# Patient Record
Sex: MALE | Race: WHITE | Employment: FULL TIME | URBAN - METROPOLITAN AREA
[De-identification: names, ages, dates, MRNs, and addresses within clinical notes are randomized per-mention and may not be internally consistent; named-entity substitution may affect disease eponyms.]

---

## 2023-02-10 ENCOUNTER — HOSPITAL ENCOUNTER (EMERGENCY)
Age: 38
Discharge: HOME | End: 2023-02-10
Attending: EMERGENCY MEDICINE
Payer: COMMERCIAL

## 2023-02-10 ENCOUNTER — OFFICE VISIT (OUTPATIENT)
Dept: PRIMARY CARE CLINIC | Age: 38
End: 2023-02-10

## 2023-02-10 ENCOUNTER — APPOINTMENT (OUTPATIENT)
Dept: CT IMAGING | Age: 38
End: 2023-02-10
Attending: EMERGENCY MEDICINE
Payer: COMMERCIAL

## 2023-02-10 VITALS
RESPIRATION RATE: 16 BRPM | OXYGEN SATURATION: 97 % | HEART RATE: 86 BPM | SYSTOLIC BLOOD PRESSURE: 126 MMHG | TEMPERATURE: 97.2 F | WEIGHT: 200.2 LBS | BODY MASS INDEX: 28.66 KG/M2 | DIASTOLIC BLOOD PRESSURE: 84 MMHG | HEIGHT: 70 IN

## 2023-02-10 VITALS
WEIGHT: 198.85 LBS | TEMPERATURE: 98.1 F | RESPIRATION RATE: 14 BRPM | SYSTOLIC BLOOD PRESSURE: 138 MMHG | OXYGEN SATURATION: 98 % | BODY MASS INDEX: 28.53 KG/M2 | DIASTOLIC BLOOD PRESSURE: 92 MMHG | HEART RATE: 78 BPM

## 2023-02-10 DIAGNOSIS — R10.31 RLQ ABDOMINAL PAIN: Primary | ICD-10-CM

## 2023-02-10 DIAGNOSIS — R10.9 ACUTE ABDOMINAL PAIN: Primary | ICD-10-CM

## 2023-02-10 LAB
ALBUMIN SERPL-MCNC: 3.9 G/DL (ref 3.5–5)
ALBUMIN/GLOB SERPL: 1 (ref 1.1–2.2)
ALP SERPL-CCNC: 89 U/L (ref 45–117)
ALT SERPL-CCNC: 26 U/L (ref 12–78)
ANION GAP SERPL CALC-SCNC: 5 MMOL/L (ref 5–15)
APPEARANCE UR: CLEAR
AST SERPL-CCNC: 22 U/L (ref 15–37)
BACTERIA URNS QL MICRO: NEGATIVE /HPF
BASOPHILS # BLD: 0.1 K/UL (ref 0–0.1)
BASOPHILS NFR BLD: 1 % (ref 0–1)
BILIRUB SERPL-MCNC: 1 MG/DL (ref 0.2–1)
BILIRUB UR QL: NEGATIVE
BUN SERPL-MCNC: 15 MG/DL (ref 6–20)
BUN/CREAT SERPL: 11 (ref 12–20)
CALCIUM SERPL-MCNC: 9.4 MG/DL (ref 8.5–10.1)
CHLORIDE SERPL-SCNC: 106 MMOL/L (ref 97–108)
CO2 SERPL-SCNC: 26 MMOL/L (ref 21–32)
COLOR UR: ABNORMAL
CREAT SERPL-MCNC: 1.34 MG/DL (ref 0.7–1.3)
DIFFERENTIAL METHOD BLD: ABNORMAL
EOSINOPHIL # BLD: 0.3 K/UL (ref 0–0.4)
EOSINOPHIL NFR BLD: 2 % (ref 0–7)
EPITH CASTS URNS QL MICRO: ABNORMAL /LPF
ERYTHROCYTE [DISTWIDTH] IN BLOOD BY AUTOMATED COUNT: 12.7 % (ref 11.5–14.5)
GLOBULIN SER CALC-MCNC: 3.9 G/DL (ref 2–4)
GLUCOSE SERPL-MCNC: 99 MG/DL (ref 65–100)
GLUCOSE UR STRIP.AUTO-MCNC: NEGATIVE MG/DL
HCT VFR BLD AUTO: 42.7 % (ref 36.6–50.3)
HGB BLD-MCNC: 14.3 G/DL (ref 12.1–17)
HGB UR QL STRIP: NEGATIVE
IMM GRANULOCYTES # BLD AUTO: 0.1 K/UL (ref 0–0.04)
IMM GRANULOCYTES NFR BLD AUTO: 0 % (ref 0–0.5)
KETONES UR QL STRIP.AUTO: ABNORMAL MG/DL
LEUKOCYTE ESTERASE UR QL STRIP.AUTO: ABNORMAL
LIPASE SERPL-CCNC: 115 U/L (ref 73–393)
LYMPHOCYTES # BLD: 2.3 K/UL (ref 0.8–3.5)
LYMPHOCYTES NFR BLD: 16 % (ref 12–49)
MCH RBC QN AUTO: 30 PG (ref 26–34)
MCHC RBC AUTO-ENTMCNC: 33.5 G/DL (ref 30–36.5)
MCV RBC AUTO: 89.7 FL (ref 80–99)
MONOCYTES # BLD: 1.1 K/UL (ref 0–1)
MONOCYTES NFR BLD: 7 % (ref 5–13)
NEUTS SEG # BLD: 10.4 K/UL (ref 1.8–8)
NEUTS SEG NFR BLD: 74 % (ref 32–75)
NITRITE UR QL STRIP.AUTO: NEGATIVE
NRBC # BLD: 0 K/UL (ref 0–0.01)
NRBC BLD-RTO: 0 PER 100 WBC
PH UR STRIP: 6.5 (ref 5–8)
PLATELET # BLD AUTO: 366 K/UL (ref 150–400)
PMV BLD AUTO: 9.1 FL (ref 8.9–12.9)
POTASSIUM SERPL-SCNC: 4.1 MMOL/L (ref 3.5–5.1)
PROT SERPL-MCNC: 7.8 G/DL (ref 6.4–8.2)
PROT UR STRIP-MCNC: NEGATIVE MG/DL
RBC # BLD AUTO: 4.76 M/UL (ref 4.1–5.7)
RBC #/AREA URNS HPF: ABNORMAL /HPF (ref 0–5)
SODIUM SERPL-SCNC: 137 MMOL/L (ref 136–145)
SP GR UR REFRACTOMETRY: 1.02
UA: UC IF INDICATED,UAUC: ABNORMAL
UROBILINOGEN UR QL STRIP.AUTO: 0.2 EU/DL (ref 0.2–1)
WBC # BLD AUTO: 14.2 K/UL (ref 4.1–11.1)
WBC URNS QL MICRO: ABNORMAL /HPF (ref 0–4)

## 2023-02-10 PROCEDURE — 74177 CT ABD & PELVIS W/CONTRAST: CPT

## 2023-02-10 PROCEDURE — 81001 URINALYSIS AUTO W/SCOPE: CPT

## 2023-02-10 PROCEDURE — 99202 OFFICE O/P NEW SF 15 MIN: CPT

## 2023-02-10 PROCEDURE — 36415 COLL VENOUS BLD VENIPUNCTURE: CPT

## 2023-02-10 PROCEDURE — 80053 COMPREHEN METABOLIC PANEL: CPT

## 2023-02-10 PROCEDURE — 85025 COMPLETE CBC W/AUTO DIFF WBC: CPT

## 2023-02-10 PROCEDURE — 99285 EMERGENCY DEPT VISIT HI MDM: CPT

## 2023-02-10 PROCEDURE — 74011000636 HC RX REV CODE- 636: Performed by: EMERGENCY MEDICINE

## 2023-02-10 PROCEDURE — 83690 ASSAY OF LIPASE: CPT

## 2023-02-10 RX ORDER — ONDANSETRON 4 MG/1
4 TABLET, ORALLY DISINTEGRATING ORAL
Qty: 20 TABLET | Refills: 0 | Status: SHIPPED | OUTPATIENT
Start: 2023-02-10

## 2023-02-10 RX ORDER — AMOXICILLIN AND CLAVULANATE POTASSIUM 875; 125 MG/1; MG/1
1 TABLET, FILM COATED ORAL 2 TIMES DAILY
Qty: 14 TABLET | Refills: 0 | Status: SHIPPED | OUTPATIENT
Start: 2023-02-10 | End: 2023-02-17

## 2023-02-10 RX ADMIN — IOPAMIDOL 100 ML: 755 INJECTION, SOLUTION INTRAVENOUS at 09:41

## 2023-02-10 NOTE — CONSULTS
CONSULT  HOSPITAL     Subjective:      Kareem Ramirez is a 30 y.o. male who presents for evaluation of right lower quadrant abdominal pain. Patient is a relatively healthy 28-year-old male, works for an [employer], no history of trauma, has history of cholecystectomy at age 17, some vague remote history of possible early Crohn's disease although never specifically diagnosed according to patient. He had a colonoscopy a couple of years ago. He has not had any diarrhea or blood per rectum or urinary symptoms. He has no microscopic or other hematuria. Yesterday evening he began having right lower quadrant abdominal pain, even while supine and resting, rather severe, but has noted that he still has some pain but it is much improved and does not hurt at rest any further. He has not eaten any raw or unusual foods. White blood cell count had white blood cell count of 14,000 electrolytes otherwise fairly unremarkable, CT scan abdomen and pelvis with reviewed with radiology with nurse practitioner Adela Shirley, revealed normal appendix and no significant evidence of inflammatory bowel disease. I was asked to see patient whether he could have appendicitis despite negative CT scan        Recent Results (from the past 48 hour(s))   METABOLIC PANEL, COMPREHENSIVE    Collection Time: 02/10/23  8:55 AM   Result Value Ref Range    Sodium 137 136 - 145 mmol/L    Potassium 4.1 3.5 - 5.1 mmol/L    Chloride 106 97 - 108 mmol/L    CO2 26 21 - 32 mmol/L    Anion gap 5 5 - 15 mmol/L    Glucose 99 65 - 100 mg/dL    BUN 15 6 - 20 MG/DL    Creatinine 1.34 (H) 0.70 - 1.30 MG/DL    BUN/Creatinine ratio 11 (L) 12 - 20      eGFR >60 >60 ml/min/1.73m2    Calcium 9.4 8.5 - 10.1 MG/DL    Bilirubin, total 1.0 0.2 - 1.0 MG/DL    ALT (SGPT) 26 12 - 78 U/L    AST (SGOT) 22 15 - 37 U/L    Alk.  phosphatase 89 45 - 117 U/L    Protein, total 7.8 6.4 - 8.2 g/dL    Albumin 3.9 3.5 - 5.0 g/dL    Globulin 3.9 2.0 - 4.0 g/dL    A-G Ratio 1.0 (L) 1.1 - 2.2     LIPASE    Collection Time: 02/10/23  8:55 AM   Result Value Ref Range    Lipase 115 73 - 393 U/L   CBC WITH AUTOMATED DIFF    Collection Time: 02/10/23  8:55 AM   Result Value Ref Range    WBC 14.2 (H) 4.1 - 11.1 K/uL    RBC 4.76 4.10 - 5.70 M/uL    HGB 14.3 12.1 - 17.0 g/dL    HCT 42.7 36.6 - 50.3 %    MCV 89.7 80.0 - 99.0 FL    MCH 30.0 26.0 - 34.0 PG    MCHC 33.5 30.0 - 36.5 g/dL    RDW 12.7 11.5 - 14.5 %    PLATELET 114 699 - 650 K/uL    MPV 9.1 8.9 - 12.9 FL    NRBC 0.0 0  WBC    ABSOLUTE NRBC 0.00 0.00 - 0.01 K/uL    NEUTROPHILS 74 32 - 75 %    LYMPHOCYTES 16 12 - 49 %    MONOCYTES 7 5 - 13 %    EOSINOPHILS 2 0 - 7 %    BASOPHILS 1 0 - 1 %    IMMATURE GRANULOCYTES 0 0.0 - 0.5 %    ABS. NEUTROPHILS 10.4 (H) 1.8 - 8.0 K/UL    ABS. LYMPHOCYTES 2.3 0.8 - 3.5 K/UL    ABS. MONOCYTES 1.1 (H) 0.0 - 1.0 K/UL    ABS. EOSINOPHILS 0.3 0.0 - 0.4 K/UL    ABS. BASOPHILS 0.1 0.0 - 0.1 K/UL    ABS. IMM. GRANS. 0.1 (H) 0.00 - 0.04 K/UL    DF AUTOMATED     URINALYSIS W/ REFLEX CULTURE    Collection Time: 02/10/23  8:55 AM    Specimen: Urine   Result Value Ref Range    Color DARK YELLOW      Appearance CLEAR CLEAR      Specific gravity 1.023      pH (UA) 6.5 5.0 - 8.0      Protein Negative NEG mg/dL    Glucose Negative NEG mg/dL    Ketone TRACE (A) NEG mg/dL    Bilirubin Negative NEG      Blood Negative NEG      Urobilinogen 0.2 0.2 - 1.0 EU/dL    Nitrites Negative NEG      Leukocyte Esterase SMALL (A) NEG      WBC 0-4 0 - 4 /hpf    RBC 0-5 0 - 5 /hpf    Epithelial cells FEW FEW /lpf    Bacteria Negative NEG /hpf    UA:UC IF INDICATED CULTURE NOT INDICATED BY UA RESULT CNI         XR Results (maximum last 3): Results from [place] encounter on 03/13/16    XR CHEST PA LAT    Impression  :    There is no acute process.  Stable exam.          Signing/Reading Doctor: Rosalind Davidson ([id number])  Approved: Rosalind Davidson ([id number])  Mar 13 2016 10:23PM      Results from Hospital Encounter encounter on 05/13/15    XR KNEE LT 3 V    Impression  :  No acute abnormality. Signing/Reading Doctor: Shanika Rust ([id number])  Approved: Shanika Rust ([id number])  May 13 2015  3:45PM      Results from Hospital Encounter encounter on 03/17/13    XR SHOULDER RT AP/LAT MIN 2 V      CT Results (maximum last 3): Results from [place] encounter on 02/10/23    CT ABD PELV W CONT    Impression  No acute process. No evidence of appendicitis. Results from [place] encounter on 03/17/13    CT SPINE CERV WO CONT      CT HEAD WO CONT      MRI Results (maximum last 3): Results from [place] encounter on 06/10/15    MRI BRAIN W WO CONT    Impression  :  1. Somewhat heterogeneous enhancement of a normal-sized pituitary gland,  with a question of a 5 mm focal area on the left, which could be a  microadenoma. 2. Otherwise negative MRI of the brain and pituitary. Signing/Reading Doctor: Shanika Rust ([id number])  Approved: Shanika Rust ([id number])  Jf 10 2015 10:11AM      Results from [place] encounter on 11/13/12    MRI LUMB SPINE WO CONT      Nuclear Medicine Results (maximum last 3): No results found for this or any previous visit. US Results (maximum last 3): No results found for this or any previous visit.       Past Medical History:   Diagnosis Date    Hypotestosteronism     Other ill-defined conditions(799.89)     GERD    Pituitary tumor      Past Surgical History:   Procedure Laterality Date    HX CHOLECYSTECTOMY        Family History   Problem Relation Age of Onset    Thyroid Disease Maternal Aunt     Hypertension Paternal Aunt     Thyroid Disease Paternal Uncle     Hypertension Paternal Uncle     Hypertension Maternal Grandmother     Diabetes Maternal Grandfather     Hypertension Maternal Grandfather     Stroke Maternal Grandfather     Thyroid Disease Paternal Grandmother     Hypertension Paternal Grandmother     Hypertension Paternal Grandfather      Social History Tobacco Use    Smoking status: Never    Smokeless tobacco: Not on file   Substance Use Topics    Alcohol use: Yes      Prior to Admission medications    Medication Sig Start Date End Date Taking? Authorizing Provider   MULTIVITAMIN PO Take  by mouth. Yes Provider, Pamela   omeprazole (PRILOSEC) 20 mg capsule Take 20 mg by mouth daily. Yes Other, MD Shanon   ibuprofen (MOTRIN) 600 mg tablet Take 1 Tab by mouth every six (6) hours as needed for Pain. Patient not taking: Reported on 2/10/2023 3/13/16   John Bradley MD      Allergies   Allergen Reactions    Codeine Nausea and Vomiting       Review of Systems   Gastrointestinal:  Negative for blood in stool, diarrhea, nausea and vomiting. Improved abdominal pain   Genitourinary: Negative. All other systems reviewed and are negative. Objective:     Patient Vitals for the past 8 hrs:   BP Temp Pulse Resp SpO2 Weight   02/10/23 0846 (!) 138/92 98.1 °F (36.7 °C) 78 14 98 % 90.2 kg (198 lb 13.7 oz)       Temp (24hrs), Av.7 °F (36.5 °C), Min:97.2 °F (36.2 °C), Max:98.1 °F (36.7 °C)      Physical Exam  Vitals and nursing note reviewed. Exam conducted with a chaperone present. Constitutional:       General: He is not in acute distress. Appearance: Normal appearance. He is not ill-appearing. HENT:      Head: Normocephalic and atraumatic. Nose: Nose normal.      Mouth/Throat:      Pharynx: Oropharynx is clear. Eyes:      Conjunctiva/sclera: Conjunctivae normal.   Cardiovascular:      Rate and Rhythm: Normal rate and regular rhythm. Pulmonary:      Effort: Pulmonary effort is normal.      Breath sounds: Normal breath sounds. Abdominal:      Comments: Flat and soft no obvious hernias mild to minimal right lower quadrant abdominal tenderness no peritoneal signs or guarding or percussion tenderness rest of the abdomen is soft and nontender   Musculoskeletal:         General: Normal range of motion.    Skin:     General: Skin is warm and dry. Neurological:      General: No focal deficit present. Mental Status: He is alert and oriented to person, place, and time. Psychiatric:         Mood and Affect: Mood normal.         Behavior: Behavior normal.         Thought Content: Thought content normal.         Judgment: Judgment normal.       Assessment:     Active Problems:    Right lower quadrant abdominal pain (2/10/2023)        Plan: This could be a very early early appendicitis not seen on CT scan or an aborted appendicitis but if it was appendicitis currently I would expect him to feel worse or not improving. This also could be early inflammatory bowel disease or enteritis of some other sort. I had a lengthy discussion with patient, review of imaging, discussion with gastroenterology who has already seen patient, and who feels this is not a Crohn's exacerbation currently, and does not need follow-up colonoscopy at this point. Despite CT scan, I explained to patient I could not entirely rule out a very early appendicitis and if patient wanted and elected to be sure then I was willing to proceed with laparoscopic possible open appendectomy but understanding potential morbidity mortality negative findings although we would remove the appendix anyway, could not promise that this would resolve any future symptoms but at least we would know whether it was the appendix or not going forward, I certainly did not recommend this but offered it if patient wanted to be sure. Another option is to treat this with antibiotics for possible early appendicitis and defer any future surgical intervention to a later date if recurring symptoms or if patient's symptoms worsen and more definitive evidence of appendicitis versus enteritis whether in the next 24 to 48 hours or further down the road.     Patient listed benefits risks alternatives and he preferred outpatient therapy no surgery at this point, have reviewed with emergency room physician  and patient to follow-up with PCP or with our office in the next week or so but return to the emergency room immediately if symptoms begin worsening or recurring. Patient to stick with liquid diet advancing to  low residue diet for next few days      FACE TO FACE time including review of any indicated imaging, discussion with patient, and other providers, exam and discussion with patient:   72          Minutes  review of CT , ,hx and exam and review with GI.      Signed By: Atul Wren MD   [facility]    February 10, 2023

## 2023-02-10 NOTE — ED NOTES
ambulatory to ED with cc of LRQ abd pain since last night with nausea.  Only GI hx is cholecystectomy when he was 14

## 2023-02-10 NOTE — DISCHARGE INSTRUCTIONS
You were evaluated in the emergency department for right lower quadrant abdominal pain. Your examination was reassuring as was your work-up including blood work which had a mild leukocytosis at 14,000 as well as your CT scan which did not show any signs of acute appendicitis. It will be important for you to follow-up with your primary care physician in 2-3 days. You can also make an appoint with GI and general surgery. Please take the antibiotics as prescribed until completion. If you develop worsening symptoms such as fevers or worsening abdominal pain, please return to the emergency department immediately.

## 2023-02-10 NOTE — ED PROVIDER NOTES
[facility]ospit[facility] EMERGENCY DEPT  EMERGENCY DEPARTMENT ENCOUNTER       Pt Name: Brianna Gary  MRN: [id number]  [place] 1985  Date of evaluation: 2/10/2023  Provider: Barron Harp MD   PCP: None  Note Started: 10:29 AM 2/10/23     CHIEF COMPLAINT       Chief Complaint   Patient presents with    Abdominal Pain     Onset last night with lower right quadrant pain, that is tender. Seen at [facility], and NP advise pt to come to ED. No vomiting. +nausea last night. HISTORY OF PRESENT ILLNESS: 1 or more elements      History From: Patient, History limited by: No limitations     Brianna Gary is a 30 y.o. male without significant medical history who presents with chief complaint of right lower quadrant abdominal pain. Symptoms onset yesterday evening around 7 PM.  Pain is located to the right lower quadrant without radiation, associate with nausea without vomiting. No fevers, no urinary symptoms, flank pain, or change in bowel habits. Pain is unfamiliar to the patient and has become severe. Pain is worsened with positional changes. He does have history of cholecystectomy but no other abdominal surgical history. Nursing Notes were all reviewed and agreed with or any disagreements were addressed in the HPI. REVIEW OF SYSTEMS        Positives and Pertinent negatives as per HPI.     PAST HISTORY     Past Medical History:  Past Medical History:   Diagnosis Date    Hypotestosteronism     Other ill-defined conditions(799.89)     GERD    Pituitary tumor        Past Surgical History:  Past Surgical History:   Procedure Laterality Date    HX CHOLECYSTECTOMY         Family History:  Family History   Problem Relation Age of Onset    Thyroid Disease Maternal Aunt     Hypertension Paternal Aunt     Thyroid Disease Paternal Uncle     Hypertension Paternal Uncle     Hypertension Maternal Grandmother     Diabetes Maternal Grandfather     Hypertension Maternal Grandfather     Stroke Maternal Grandfather     Thyroid Disease Paternal Grandmother     Hypertension Paternal Grandmother     Hypertension Paternal Grandfather        Social History:  Social History     Tobacco Use    Smoking status: Never   Substance Use Topics    Alcohol use: Yes    Drug use: No       Allergies: Allergies   Allergen Reactions    Codeine Nausea and Vomiting       CURRENT MEDICATIONS      Discharge Medication List as of 2/10/2023 12:15 PM        CONTINUE these medications which have NOT CHANGED    Details   MULTIVITAMIN PO Take  by mouth., Historical Med      omeprazole (PRILOSEC) 20 mg capsule Take 20 mg by mouth daily. , Historical Med      ibuprofen (MOTRIN) 600 mg tablet Take 1 Tab by mouth every six (6) hours as needed for Pain., Print, Disp-20 Tab, R-0             SCREENINGS               No data recorded         PHYSICAL EXAM      ED Triage Vitals [02/10/23 0846]   ED Encounter Vitals Group      BP (!) 138/92      Pulse (Heart Rate) 78      Resp Rate 14      Temp 98.1 °F (36.7 °C)      Temp src       O2 Sat (%) 98 %      Weight 198 lb 13.7 oz      Height         Physical Exam  Vitals and nursing note reviewed. Constitutional:       General: He is not in acute distress. Appearance: He is well-developed. He is not ill-appearing. Cardiovascular:      Rate and Rhythm: Normal rate and regular rhythm. Heart sounds: No murmur heard. Pulmonary:      Effort: Pulmonary effort is normal. No respiratory distress. Breath sounds: Normal breath sounds. Abdominal:      General: Abdomen is flat. There is no distension. Tenderness: There is abdominal tenderness (Reproducible TTP at McBurney's point in the right lower quadrant with guarding and rebound, positive Rovsing sign, positive psoas sign. ). There is guarding and rebound. There is no right CVA tenderness or left CVA tenderness. Neurological:      Mental Status: He is alert.         DIAGNOSTIC RESULTS   LABS:     Recent Results (from the past 12 hour(s))   METABOLIC PANEL, COMPREHENSIVE    Collection Time: 02/10/23  8:55 AM   Result Value Ref Range    Sodium 137 136 - 145 mmol/L    Potassium 4.1 3.5 - 5.1 mmol/L    Chloride 106 97 - 108 mmol/L    CO2 26 21 - 32 mmol/L    Anion gap 5 5 - 15 mmol/L    Glucose 99 65 - 100 mg/dL    BUN 15 6 - 20 MG/DL    Creatinine 1.34 (H) 0.70 - 1.30 MG/DL    BUN/Creatinine ratio 11 (L) 12 - 20      eGFR >60 >60 ml/min/1.73m2    Calcium 9.4 8.5 - 10.1 MG/DL    Bilirubin, total 1.0 0.2 - 1.0 MG/DL    ALT (SGPT) 26 12 - 78 U/L    AST (SGOT) 22 15 - 37 U/L    Alk. phosphatase 89 45 - 117 U/L    Protein, total 7.8 6.4 - 8.2 g/dL    Albumin 3.9 3.5 - 5.0 g/dL    Globulin 3.9 2.0 - 4.0 g/dL    A-G Ratio 1.0 (L) 1.1 - 2.2     LIPASE    Collection Time: 02/10/23  8:55 AM   Result Value Ref Range    Lipase 115 73 - 393 U/L   CBC WITH AUTOMATED DIFF    Collection Time: 02/10/23  8:55 AM   Result Value Ref Range    WBC 14.2 (H) 4.1 - 11.1 K/uL    RBC 4.76 4.10 - 5.70 M/uL    HGB 14.3 12.1 - 17.0 g/dL    HCT 42.7 36.6 - 50.3 %    MCV 89.7 80.0 - 99.0 FL    MCH 30.0 26.0 - 34.0 PG    MCHC 33.5 30.0 - 36.5 g/dL    RDW 12.7 11.5 - 14.5 %    PLATELET 287 629 - 504 K/uL    MPV 9.1 8.9 - 12.9 FL    NRBC 0.0 0  WBC    ABSOLUTE NRBC 0.00 0.00 - 0.01 K/uL    NEUTROPHILS 74 32 - 75 %    LYMPHOCYTES 16 12 - 49 %    MONOCYTES 7 5 - 13 %    EOSINOPHILS 2 0 - 7 %    BASOPHILS 1 0 - 1 %    IMMATURE GRANULOCYTES 0 0.0 - 0.5 %    ABS. NEUTROPHILS 10.4 (H) 1.8 - 8.0 K/UL    ABS. LYMPHOCYTES 2.3 0.8 - 3.5 K/UL    ABS. MONOCYTES 1.1 (H) 0.0 - 1.0 K/UL    ABS. EOSINOPHILS 0.3 0.0 - 0.4 K/UL    ABS. BASOPHILS 0.1 0.0 - 0.1 K/UL    ABS. IMM.  GRANS. 0.1 (H) 0.00 - 0.04 K/UL    DF AUTOMATED     URINALYSIS W/ REFLEX CULTURE    Collection Time: 02/10/23  8:55 AM    Specimen: Urine   Result Value Ref Range    Color DARK YELLOW      Appearance CLEAR CLEAR      Specific gravity 1.023      pH (UA) 6.5 5.0 - 8.0      Protein Negative NEG mg/dL    Glucose Negative NEG mg/dL    Ketone TRACE (A) NEG mg/dL    Bilirubin Negative NEG      Blood Negative NEG      Urobilinogen 0.2 0.2 - 1.0 EU/dL    Nitrites Negative NEG      Leukocyte Esterase SMALL (A) NEG      WBC 0-4 0 - 4 /hpf    RBC 0-5 0 - 5 /hpf    Epithelial cells FEW FEW /lpf    Bacteria Negative NEG /hpf    UA:UC IF INDICATED CULTURE NOT INDICATED BY UA RESULT CNI          EKG: If performed, independent interpretation documented below in the MDM section     RADIOLOGY:  Non-plain film images such as CT, Ultrasound and MRI are read by the radiologist. Plain radiographic images are visualized and preliminarily interpreted by the ED Provider with the findings documented in the MDM section. Interpretation per the Radiologist below, if available at the time of this note:     CT ABD PELV W CONT    Result Date: 2/10/2023  EXAM: CT ABD PELV W CONT INDICATION: RLQ pain, n/v, wbc 14K, r/o appendicitis COMPARISON: None available CONTRAST: 100 mL of Isovue-370. ORAL CONTRAST: None TECHNIQUE: Following the uneventful intravenous administration of contrast, thin axial images were obtained through the abdomen and pelvis. Coronal and sagittal reconstructions were generated. CT dose reduction was achieved through use of a standardized protocol tailored for this examination and automatic exposure control for dose modulation. FINDINGS: LOWER THORAX: No significant abnormality in the incidentally imaged lower chest. LIVER: No mass. BILIARY TREE: Status post cholecystectomy. CBD is not dilated. SPLEEN: within normal limits. PANCREAS: No mass or ductal dilatation. ADRENALS: Unremarkable. KIDNEYS: No mass, calculus, or hydronephrosis. STOMACH: Unremarkable. SMALL BOWEL: No dilatation or wall thickening. COLON: No dilatation or wall thickening. APPENDIX: Unremarkable. PERITONEUM: No ascites or pneumoperitoneum. RETROPERITONEUM: No lymphadenopathy or aortic aneurysm. REPRODUCTIVE ORGANS: Unremarkable. URINARY BLADDER: No mass or calculus.  BONES: No destructive bone lesion. ABDOMINAL WALL: No mass or hernia. ADDITIONAL COMMENTS: N/A     No acute process. No evidence of appendicitis. PROCEDURES   Unless otherwise noted below, none  Procedures     CRITICAL CARE TIME   0    EMERGENCY DEPARTMENT COURSE and DIFFERENTIAL DIAGNOSIS/MDM   Vitals:    Vitals:    02/10/23 0846   BP: (!) 138/92   Pulse: 78   Resp: 14   Temp: 98.1 °F (36.7 °C)   SpO2: 98%   Weight: 90.2 kg (198 lb 13.7 oz)        Patient was given the following medications:  Medications   iopamidoL (ISOVUE-370) 370 mg iodine /mL (76 %) injection 100 mL (100 mL IntraVENous Given 2/10/23 0941)       Medical Decision Making  Amount and/or Complexity of Data Reviewed  Labs: ordered. Decision-making details documented in ED Course. Radiology: ordered. Decision-making details documented in ED Course. Discussion of management or test interpretation with external provider(s): Surgery, Dr Coby Cueto  OTC drugs. Prescription drug management. Decision regarding hospitalization. This is a healthy 56-year-old male presenting to the emergency department with 12 to 15 hours of right lower quadrant abdominal pain with associated nausea without vomiting. Afebrile and vital signs stable. He does have a very concerning exam for acute appendicitis with TTP to the right lower quadrant with associated guarding and rebound. He does have positive Rovsing sign as well as a positive psoas sign. CT however negative for acute process. Serial abdominal exams demonstrate persistent right lower quadrant pain at McBurney's point. I maintain high suspicion for acute appendicitis and I will consult general surgery. ED Course as of 02/10/23 1522   Fri Feb 10, 2023   1021 On reassessment patient still having significant reproducible TTP with guarding and rebound at McBurney's point with positive Rovsing sign and positive psoas sign.   He is noted to have leukocytosis 14,000 however CT imaging negative for acute process such as appendicitis. I have maintained high clinical concern for acute appendicitis, symptoms have been present for just over 12 hours and it may be that it is too early to see radiographically. I will consult general surgery. [AK]   1216 Patient has been evaluated by general surgery as well as gastroenterology. Concern is for possible terminal ileitis reflective of IBD or other colitis versus early acute appendicitis. General surgery offered appendectomy but patient defers at this time. Recommend starting on Augmentin x1 week with follow-up with surgery, PCP, GI. Patient knows to return to the ED immediately for any development of fevers or worsening abdominal pain. He lives nearby and does not have transportation issues. He is in agreement with this plan and comfortable. All questions answered. [AK]      ED Course User Index  [AK] Eliza Hdez MD       FINAL IMPRESSION     1. RLQ abdominal pain          DISPOSITION/PLAN     Discharge Note:  The patient has been re-evaluated and is ready for discharge. Reviewed available results with patient. Counseled patient on diagnosis and care plan. Patient has expressed understanding, and all questions have been answered. Patient agrees with plan and agrees to follow up as recommended, or to return to the ED if their symptoms worsen. Discharge instructions have been provided and explained to the patient, along with reasons to return to the ED.         CLINICAL IMPRESSION    1. RLQ abdominal pain         DISPOSITION  discharge     PATIENT REFERRED TO:  Follow-up Information       Follow up With Specialties Details Why Demarco Salvador MD Surgery Physician Schedule an appointment as soon as possible for a visit   [facility] [address]  [address] [facility]      Freedom Cash MD Gastroenterology Schedule an appointment as soon as possible for a visit   [address]  [address] [address] [phone]      [facility] EMERGENCY DEPT Emergency Medicine Go to  As needed, If symptoms worsen [address] [place] [address]  [phone]              DISCHARGE MEDICATIONS:  Discharge Medication List as of 2/10/2023 12:15 PM        START taking these medications    Details   amoxicillin-clavulanate (Augmentin) 875-125 mg per tablet Take 1 Tablet by mouth two (2) times a day for 7 days. , Normal, Disp-14 Tablet, R-0           CONTINUE these medications which have NOT CHANGED    Details   MULTIVITAMIN PO Take  by mouth., Historical Med      omeprazole (PRILOSEC) 20 mg capsule Take 20 mg by mouth daily. , Historical Med      ibuprofen (MOTRIN) 600 mg tablet Take 1 Tab by mouth every six (6) hours as needed for Pain., Print, Disp-20 Tab, R-0               DISCONTINUED MEDICATIONS:  Discharge Medication List as of 2/10/2023 12:15 PM          I am the Primary Clinician of Record. Ngoc Lopez MD (electronically signed)    (Please note that parts of this dictation were completed with voice recognition software. Quite often unanticipated grammatical, syntax, homophones, and other interpretive errors are inadvertently transcribed by the computer software. Please disregards these errors.  Please excuse any errors that have escaped final proofreading.)

## 2023-02-10 NOTE — PROGRESS NOTES
HISTORY OF PRESENT ILLNESS  Paz Boyce is a 30 y.o. male. Chief Complaint   Patient presents with    Abdominal Pain     Started last night after dinner; lower right side tender and painful; nausea   In addition, reports cramping like pains that has dejuan constant and not subsided. Pain is exacerbated by positions (worse when standing or turning). He reports using Gas-ex and pepto before bed last night without relief, pain than began more severe overnight. He now is having nausea and headache. Last BM this morning that looked normal, denies blood in stool. Urinating well. Reports having a lot of \"GI issues\" as a child so had gallbladder removed symptoms subsided afterwards. Review of Systems   Constitutional:  Negative for chills and fever. Respiratory:  Negative for shortness of breath. Cardiovascular: Negative. Gastrointestinal:  Positive for abdominal pain (RLQ) and nausea. Negative for vomiting. Genitourinary: Negative. Negative for dysuria and flank pain. Musculoskeletal: Negative. Skin: Negative. Neurological:  Positive for headaches. Physical Exam  Constitutional:       Appearance: Normal appearance. He is not ill-appearing. HENT:      Head: Normocephalic. Mouth/Throat:      Mouth: Mucous membranes are moist.      Pharynx: Oropharynx is clear. Cardiovascular:      Rate and Rhythm: Normal rate. Pulses: Normal pulses. Heart sounds: Normal heart sounds. Pulmonary:      Effort: Pulmonary effort is normal.      Breath sounds: Normal breath sounds. Abdominal:      General: Bowel sounds are normal. There is no distension. Tenderness: There is abdominal tenderness (RLQ) in the right lower quadrant. There is guarding. There is no right CVA tenderness, left CVA tenderness or rebound. Positive signs include Rovsing's sign and McBurney's sign. Comments: Extremely tender to RLQ with light/deep palpation. Skin:     General: Skin is warm.    Neurological: General: No focal deficit present. Mental Status: He is alert and oriented to person, place, and time. Past Medical History:   Diagnosis Date    Hypotestosteronism     Other ill-defined conditions(799.89)     GERD    Pituitary tumor      Past Surgical History:   Procedure Laterality Date    HX CHOLECYSTECTOMY       /84 (BP 1 Location: Left arm, BP Patient Position: Sitting)   Pulse 86   Temp 97.2 °F (36.2 °C) (Temporal)   Resp 16   Ht 5' 10\" (1.778 m)   Wt 200 lb 3.2 oz (90.8 kg)   SpO2 97%   BMI 28.73 kg/m²     ASSESSMENT and PLAN  1. Acute abdominal pain  Discussed abd pain work up could be done in this clinic setting (lab work) but that imaging would be warranted as well. Discussed with patient need for further work up and that ED would be the appropriate place to go. Patient agrees with plan and is stable, declined EMS transport, will go to ER by PV. Attempted to call ED [facility] ED for report, unable to talk to staff at this time. DDx: acute abd, appendicitis, diverticulitis.   Jay Leahy NP

## 2023-02-10 NOTE — PROGRESS NOTES
Identified pt with two pt identifiers(name and ). Reviewed record in preparation for visit and have obtained necessary documentation. Chief Complaint   Patient presents with    Abdominal Pain     Started last night after dinner; lower right side tender and painful; nausea      Vitals:    02/10/23 0811   BP: 126/84   Pulse: 86   Resp: 16   Temp: 97.2 °F (36.2 °C)   TempSrc: Temporal   SpO2: 97%   Weight: 200 lb 3.2 oz (90.8 kg)   Height: 5' 10\" (1.778 m)   PainSc:   4   PainLoc: Abdomen       Health Maintenance Review: Patient reminded of \"due or due soon\" health maintenance. I have asked the patient to contact his/her primary care provider (PCP) for follow-up on his/her health maintenance. Coordination of Care Questionnaire:  :   1) Have you been to an emergency room, urgent care, or hospitalized since your last visit? If yes, where when, and reason for visit? no       2. Have seen or consulted any other health care provider since your last visit? If yes, where when, and reason for visit? NO      Patient is accompanied by self I have received verbal consent from Kristy Emerson to discuss any/all medical information while they are present in the room.

## 2023-02-10 NOTE — CONSULTS
Gastroenterology Consult  (Mateo Senior, [place] for Dr. Isak Ohara)     Referring Physician: Dr. Caryl Rivas Date: 2/10/2023     Subjective:     Chief Complaint: abd pain    History of Present Illness: Saurabh Blackwell is a 30 y.o. healthy white male who is seen in consultation for possible Crohn's. He was in his usual state of health yesterday when he developed abdominal pain around 7 PM.  At first it was generalized and felt like a cramping or tightening of his whole abdomen. By 10 PM it was localized to the right lower quadrant and was pretty severe. At some point he was able to fall asleep but woke up in the middle the night with ongoing pain. He went to [facility] this morning and was sent to the emergency room for concerns about appendicitis. He denies any fevers chills or nausea or vomiting. The pain is no longer as intense as it was. His bowel movements have been normal.  He denies any diarrhea blood in the stool or constipation. He is followed by Dr. Cedrick Charles. He reports he had a normal colonoscopy 2 years ago. He has GERD and EOE. He has been seeing a GI since he was 17 or 15. In the beginning they were concerned about Crohn's disease but after his gallbladder was removed all of his lower GI symptoms resolved. He has had normal bowel movements without abdominal pain since. In the emergency department his white blood cell count is 14.2. CT abdomen and pelvis with contrast shows no acute process. No evidence of appendicitis. No colon or small bowel dilation or wall thickening. The pancreas is without abnormalities as was the stomach common bile duct and liver. Lipase normal.  He is a . Denies tobacco or drugs. He drinks alcohol occasionally but none recently.     Past Medical History:   Diagnosis Date    Hypotestosteronism     Other ill-defined conditions(689.89)     GERD    Pituitary tumor      Past Surgical History:   Procedure Laterality Date    HX CHOLECYSTECTOMY        Family History   Problem Relation Age of Onset    Thyroid Disease Maternal Aunt     Hypertension Paternal Aunt     Thyroid Disease Paternal Uncle     Hypertension Paternal Uncle     Hypertension Maternal Grandmother     Diabetes Maternal Grandfather     Hypertension Maternal Grandfather     Stroke Maternal Grandfather     Thyroid Disease Paternal Grandmother     Hypertension Paternal Grandmother     Hypertension Paternal Grandfather      Social History     Tobacco Use    Smoking status: Never    Smokeless tobacco: Not on file   Substance Use Topics    Alcohol use: Yes      Allergies   Allergen Reactions    Codeine Nausea and Vomiting     No current facility-administered medications for this encounter. Current Outpatient Medications   Medication Sig    amoxicillin-clavulanate (Augmentin) 875-125 mg per tablet Take 1 Tablet by mouth two (2) times a day for 7 days. MULTIVITAMIN PO Take  by mouth. omeprazole (PRILOSEC) 20 mg capsule Take 20 mg by mouth daily. ibuprofen (MOTRIN) 600 mg tablet Take 1 Tab by mouth every six (6) hours as needed for Pain. (Patient not taking: Reported on 2/10/2023)        Review of Systems:  A detailed review of systems was performed as follows:  Constitutional:  Negative  Eyes:  No ocular sensitivity to the sun, blurred vision or double vision. ENMT:  No nose or sinus problems. Respiratory: No coughing, wheezing or sob  Cardiac:  No chest pain, exertional chest pain or palpitations  Gastrointestinal:  See history of the present illness  :   No pain with urination or hematuria  Musculoskeletal:  No arthritis or hot swollen joints. Endocrine:  No thyroid disease or diabetes  Psychiatric: No depression or feeling blue  Integumentary:  No skin rash or sensitivity to the sun. Neurologic:  No stroke or seizure; no numbness or tingling of the extremities.   Heme-Lymphatic:  No history of anemia, no unexplained lumps or bumps      Objective:     Physical Exam:  Visit Vitals  BP (!) 138/92 (BP Patient Position: Sitting)   Pulse 78   Temp 98.1 °F (36.7 °C)   Resp 14   Wt 90.2 kg (198 lb 13.7 oz)   SpO2 98%   BMI 28.53 kg/m²        Gen: White male resting comfortably no acute distress  Skin:  Extremities and face reveal no rashes. HEENT: Sclerae anicteric. No abnormal pigmentation of the lips. .  Cardiovascular: Regular rate and rhythm. No murmurs, gallops, or rubs. Respiratory:  Comfortable breathing with no accessory muscle use. Clear breath sounds with no wheezes, rales, or rhonchi. GI: Abdomen is nondistended, normal active bowel sounds. There is tenderness in the right lower quadrant no guarding or rebounding. No enlargement of the liver or spleen. No masses palpable. Rectal:  Deferred  Musculoskeletal:  No pitting edema of the lower legs. Neurological:  Gross memory appears intact. Patient is alert and oriented. Psychiatric:  Mood appears appropriate with judgement intact. Lymphatic:  No cervical or supraclavicular adenopathy.     Lab/Data Review:  CMP:   Lab Results   Component Value Date/Time     02/10/2023 08:55 AM    K 4.1 02/10/2023 08:55 AM     02/10/2023 08:55 AM    CO2 26 02/10/2023 08:55 AM    AGAP 5 02/10/2023 08:55 AM    GLU 99 02/10/2023 08:55 AM    BUN 15 02/10/2023 08:55 AM    CREA 1.34 (H) 02/10/2023 08:55 AM    CA 9.4 02/10/2023 08:55 AM    ALB 3.9 02/10/2023 08:55 AM    TP 7.8 02/10/2023 08:55 AM    GLOB 3.9 02/10/2023 08:55 AM    AGRAT 1.0 (L) 02/10/2023 08:55 AM    ALT 26 02/10/2023 08:55 AM     CBC:   Lab Results   Component Value Date/Time    WBC 14.2 (H) 02/10/2023 08:55 AM    HGB 14.3 02/10/2023 08:55 AM    HCT 42.7 02/10/2023 08:55 AM     02/10/2023 08:55 AM     CT Results (most recent):  Results from Hospital Encounter encounter on 02/10/23    CT ABD PELV W CONT    Narrative  EXAM: CT ABD PELV W CONT    INDICATION: RLQ pain, n/v, wbc 14K, r/o appendicitis    COMPARISON: None available    CONTRAST: 100 mL of Isovue-370. ORAL CONTRAST: None    TECHNIQUE:  Following the uneventful intravenous administration of contrast, thin axial  images were obtained through the abdomen and pelvis. Coronal and sagittal  reconstructions were generated. CT dose reduction was achieved through use of a  standardized protocol tailored for this examination and automatic exposure  control for dose modulation. FINDINGS:  LOWER THORAX: No significant abnormality in the incidentally imaged lower chest.  LIVER: No mass. BILIARY TREE: Status post cholecystectomy. CBD is not dilated. SPLEEN: within normal limits. PANCREAS: No mass or ductal dilatation. ADRENALS: Unremarkable. KIDNEYS: No mass, calculus, or hydronephrosis. STOMACH: Unremarkable. SMALL BOWEL: No dilatation or wall thickening. COLON: No dilatation or wall thickening. APPENDIX: Unremarkable. PERITONEUM: No ascites or pneumoperitoneum. RETROPERITONEUM: No lymphadenopathy or aortic aneurysm. REPRODUCTIVE ORGANS: Unremarkable. URINARY BLADDER: No mass or calculus. BONES: No destructive bone lesion. ABDOMINAL WALL: No mass or hernia. ADDITIONAL COMMENTS: N/A    Impression  No acute process. No evidence of appendicitis. Assessment/Plan:     Active Problems:    Right lower quadrant abdominal pain (2/10/2023)         44-year-old healthy white male presents with acute onset of right lower quadrant pain and leukocytosis. CT with contrast is unremarkable. He has been having normal bowel movements. There is no convincing history of Crohn's disease. He reports his pain is not as severe this morning as it was last night. He is being evaluated by surgery. At this time unless he develops diarrhea or bloody stools we would not recommend any further GI work-up or colonoscopy. He can follow-up as an outpatient with his usual gastroenterologist, Dr. Katarina Rosen. Please call with any questions or concerns.     EDUARDA Boyer  02/10/23  12:21 PM

## 2023-02-10 NOTE — ED NOTES
Iv removed tip intact. Pt was ambulatory at time of dc. Went over all papers with pt prior to leaving, pt verbalized understanding.